# Patient Record
Sex: MALE | Race: AMERICAN INDIAN OR ALASKA NATIVE | HISPANIC OR LATINO | URBAN - METROPOLITAN AREA
[De-identification: names, ages, dates, MRNs, and addresses within clinical notes are randomized per-mention and may not be internally consistent; named-entity substitution may affect disease eponyms.]

---

## 2024-04-08 ENCOUNTER — OV NP (OUTPATIENT)
Dept: URBAN - METROPOLITAN AREA CLINIC 66 | Facility: CLINIC | Age: 39
End: 2024-04-08
Payer: SELF-PAY

## 2024-04-08 ENCOUNTER — LAB (OUTPATIENT)
Dept: URBAN - METROPOLITAN AREA CLINIC 66 | Facility: CLINIC | Age: 39
End: 2024-04-08

## 2024-04-08 VITALS
SYSTOLIC BLOOD PRESSURE: 124 MMHG | WEIGHT: 148 LBS | DIASTOLIC BLOOD PRESSURE: 86 MMHG | BODY MASS INDEX: 29.06 KG/M2 | HEIGHT: 60 IN

## 2024-04-08 DIAGNOSIS — K92.1 HEMATOCHEZIA: ICD-10-CM

## 2024-04-08 DIAGNOSIS — Z86.19 H/O HELICOBACTER INFECTION: ICD-10-CM

## 2024-04-08 DIAGNOSIS — R68.81 EARLY SATIETY: ICD-10-CM

## 2024-04-08 PROBLEM — 442076002: Status: ACTIVE | Noted: 2024-04-08

## 2024-04-08 PROBLEM — 119981000119101: Status: ACTIVE | Noted: 2024-04-08

## 2024-04-08 PROBLEM — 449341000124102: Status: ACTIVE | Noted: 2024-04-08

## 2024-04-08 PROCEDURE — 99204 OFFICE O/P NEW MOD 45 MIN: CPT | Performed by: INTERNAL MEDICINE

## 2024-04-08 NOTE — HPI-TODAY'S VISIT:
Case of a 39-year-old male patient comes in today for evaluation.  Of note he has a pertinent history of H. pylori infection.  He was managed with antibiotic in the past.  Nevertheless he refers that since being treated for this consideration he has remained with bouts of early satiety and postprandial bloating.  Furthermore he has noted a change in his bowel habits.  He now has to strain to defecate.  Sometimes he develops a painful perianal bulge.  He has episodes of rectal bleeding henry hematochezia to blood-tinged toilet paper.  He was recently started on Preparation H for symptom improvement with limited response.  He comes in today for evaluation.

## 2024-04-15 ENCOUNTER — LAB (OUTPATIENT)
Dept: URBAN - METROPOLITAN AREA CLINIC 66 | Facility: CLINIC | Age: 39
End: 2024-04-15

## 2024-05-08 ENCOUNTER — TELEPHONE ENCOUNTER (OUTPATIENT)
Dept: URBAN - METROPOLITAN AREA CLINIC 66 | Facility: CLINIC | Age: 39
End: 2024-05-08

## 2024-05-09 ENCOUNTER — OFFICE VISIT (OUTPATIENT)
Dept: URBAN - METROPOLITAN AREA SURGERY CENTER 12 | Facility: SURGERY CENTER | Age: 39
End: 2024-05-09
Payer: SELF-PAY

## 2024-05-09 ENCOUNTER — CLAIMS CREATED FROM THE CLAIM WINDOW (OUTPATIENT)
Dept: URBAN - METROPOLITAN AREA CLINIC 4 | Facility: CLINIC | Age: 39
End: 2024-05-09
Payer: SELF-PAY

## 2024-05-09 DIAGNOSIS — K31.89 OTHER DISEASES OF STOMACH AND DUODENUM: ICD-10-CM

## 2024-05-09 DIAGNOSIS — K29.70 GASTRITIS WITHOUT BLEEDING, UNSPECIFIED CHRONICITY, UNSPECIFIED GASTRITIS TYPE: ICD-10-CM

## 2024-05-09 DIAGNOSIS — K29.60 OTHER GASTRITIS WITHOUT BLEEDING: ICD-10-CM

## 2024-05-09 DIAGNOSIS — K92.1 MELENA: ICD-10-CM

## 2024-05-09 DIAGNOSIS — K64.8 OTHER HEMORRHOIDS: ICD-10-CM

## 2024-05-09 DIAGNOSIS — K29.70 GASTRITIS, UNSPECIFIED, WITHOUT BLEEDING: ICD-10-CM

## 2024-05-09 DIAGNOSIS — K29.81 DUODENITIS WITH BLEEDING: ICD-10-CM

## 2024-05-09 PROCEDURE — 43239 EGD BIOPSY SINGLE/MULTIPLE: CPT | Performed by: INTERNAL MEDICINE

## 2024-05-09 PROCEDURE — 45378 DIAGNOSTIC COLONOSCOPY: CPT | Performed by: INTERNAL MEDICINE

## 2024-05-09 PROCEDURE — 88312 SPECIAL STAINS GROUP 1: CPT | Performed by: PATHOLOGY

## 2024-05-09 PROCEDURE — 00813 ANES UPR LWR GI NDSC PX: CPT | Performed by: NURSE ANESTHETIST, CERTIFIED REGISTERED

## 2024-05-09 PROCEDURE — 88305 TISSUE EXAM BY PATHOLOGIST: CPT | Performed by: PATHOLOGY

## 2024-05-13 ENCOUNTER — TELEPHONE ENCOUNTER (OUTPATIENT)
Dept: URBAN - METROPOLITAN AREA CLINIC 66 | Facility: CLINIC | Age: 39
End: 2024-05-13

## 2024-05-13 RX ORDER — PANTOPRAZOLE SODIUM 40 MG/1
1 TABLET TABLET, DELAYED RELEASE ORAL ONCE A DAY
Qty: 90 TABLET | OUTPATIENT
Start: 2024-05-13

## 2024-05-21 ENCOUNTER — DASHBOARD ENCOUNTERS (OUTPATIENT)
Age: 39
End: 2024-05-21

## 2024-05-23 ENCOUNTER — OFFICE VISIT (OUTPATIENT)
Dept: URBAN - METROPOLITAN AREA CLINIC 68 | Facility: CLINIC | Age: 39
End: 2024-05-23
Payer: SELF-PAY

## 2024-05-23 VITALS
HEIGHT: 60 IN | BODY MASS INDEX: 29.06 KG/M2 | DIASTOLIC BLOOD PRESSURE: 80 MMHG | WEIGHT: 148 LBS | SYSTOLIC BLOOD PRESSURE: 122 MMHG

## 2024-05-23 DIAGNOSIS — K29.80 DUODENITIS: ICD-10-CM

## 2024-05-23 DIAGNOSIS — K64.1 GRADE II HEMORRHOIDS: ICD-10-CM

## 2024-05-23 PROBLEM — 90458007: Status: ACTIVE | Noted: 2024-05-23

## 2024-05-23 PROBLEM — 721704005: Status: ACTIVE | Noted: 2024-05-23

## 2024-05-23 PROBLEM — 72007001: Status: ACTIVE | Noted: 2024-05-22

## 2024-05-23 PROCEDURE — 99214 OFFICE O/P EST MOD 30 MIN: CPT | Performed by: INTERNAL MEDICINE

## 2024-05-23 RX ORDER — PANTOPRAZOLE SODIUM 40 MG/1
1 TABLET TABLET, DELAYED RELEASE ORAL ONCE A DAY
Qty: 90 TABLET | Status: ACTIVE | COMMUNITY
Start: 2024-05-13

## 2024-07-23 ENCOUNTER — OFFICE VISIT (OUTPATIENT)
Dept: URBAN - METROPOLITAN AREA CLINIC 68 | Facility: CLINIC | Age: 39
End: 2024-07-23
Payer: SELF-PAY

## 2024-07-23 ENCOUNTER — LAB OUTSIDE AN ENCOUNTER (OUTPATIENT)
Dept: URBAN - METROPOLITAN AREA CLINIC 68 | Facility: CLINIC | Age: 39
End: 2024-07-23

## 2024-07-23 VITALS
DIASTOLIC BLOOD PRESSURE: 82 MMHG | WEIGHT: 148 LBS | SYSTOLIC BLOOD PRESSURE: 123 MMHG | HEIGHT: 60 IN | BODY MASS INDEX: 29.06 KG/M2

## 2024-07-23 DIAGNOSIS — R68.81 EARLY SATIETY: ICD-10-CM

## 2024-07-23 DIAGNOSIS — K29.80 DUODENITIS: ICD-10-CM

## 2024-07-23 PROCEDURE — 99214 OFFICE O/P EST MOD 30 MIN: CPT | Performed by: INTERNAL MEDICINE

## 2024-07-23 RX ORDER — PANTOPRAZOLE SODIUM 40 MG/1
1 TABLET TABLET, DELAYED RELEASE ORAL ONCE A DAY
Qty: 90 TABLET | Status: ACTIVE | COMMUNITY
Start: 2024-05-13

## 2024-07-23 RX ORDER — PANTOPRAZOLE SODIUM 40 MG/1
1 TABLET TABLET, DELAYED RELEASE ORAL ONCE A DAY
Qty: 90 | Refills: 0
Start: 2024-05-13

## 2024-07-23 NOTE — PHYSICAL EXAM GASTROINTESTINAL
Abdomen , soft, nontender, nondistended , no guarding or rigidity , no masses palpable , normal bowel sounds , Liver and Spleen , no hepatomegaly present , no hepatosplenomegaly , liver nontender , spleen not palpable
98

## 2024-07-23 NOTE — HPI-TODAY'S VISIT:
Case of a 39-year-old male patient comes in today for follow up of early sataety and hematochezia and constipation.  Of note he has a pertinent history of H. pylori infection.  He was managed with antibiotic in the past.  Nevertheless he refers that since being treated for this condition he has remained with bouts of early satiety and postprandial bloating.  Furthermore he has noted a change in his bowel habits.  He now has to strain to defecate.  Sometimes he develops a painful perianal bulge.  He has episodes of rectal bleeding henry hematochezia to blood-tinged toilet paper.  He was recently started on Preparation H for symptom improvement with limited response.  He underwent EGD and was found with duodenitis no HP. Colonoscopy was remarakable for internal hemorrhoids. On the last appointment his symptoms had improve in [facility]. He refers today he feels he continues to improve. He continues on Ppi daily. He has gain back appetite. His weight is increasing.

## 2024-10-28 ENCOUNTER — TELEPHONE ENCOUNTER (OUTPATIENT)
Dept: URBAN - METROPOLITAN AREA CLINIC 68 | Facility: CLINIC | Age: 39
End: 2024-10-28

## 2024-10-28 RX ORDER — PANTOPRAZOLE SODIUM 40 MG/1
1 TABLET TABLET, DELAYED RELEASE ORAL ONCE A DAY
Qty: 90 | Refills: 0
Start: 2024-05-13